# Patient Record
Sex: FEMALE | Race: OTHER | NOT HISPANIC OR LATINO | URBAN - METROPOLITAN AREA
[De-identification: names, ages, dates, MRNs, and addresses within clinical notes are randomized per-mention and may not be internally consistent; named-entity substitution may affect disease eponyms.]

---

## 2018-10-23 VITALS
TEMPERATURE: 97 F | RESPIRATION RATE: 14 BRPM | OXYGEN SATURATION: 97 % | DIASTOLIC BLOOD PRESSURE: 56 MMHG | HEART RATE: 62 BPM | SYSTOLIC BLOOD PRESSURE: 130 MMHG

## 2018-10-23 RX ORDER — CHLORHEXIDINE GLUCONATE 213 G/1000ML
1 SOLUTION TOPICAL ONCE
Qty: 0 | Refills: 0 | Status: DISCONTINUED | OUTPATIENT
Start: 2018-10-24 | End: 2018-10-24

## 2018-10-23 NOTE — H&P ADULT - HISTORY OF PRESENT ILLNESS
78 yo F with PMHx of CAD, HFpEF (EF 55-60% via ECHO 4/2018), DVT who presented to Cardiologist Dr. Horton with c/o SSCP radiating down right arm described as tight/pinching feeling like a hippo is sitting on her chest and of 5/10 intensity worsened with deep breath with associated dizziness occurring 4-5 times per day independent of exertion over past 2 weeks. Pt reports fatigue upon ambulation of 1-2 city blocks which is her same exercise tolerance as 1 year ago. Pt also endorses ___ pillow orthopnea and PND occurring 2-3 times per week over past ____________. Pt reports sleeping in hospital bed at which keeps her elevated.     ECHO 4/2018 revealed LVEF 55-60%  Holter monitor 11/2013 revealed NSR, no tachy-arrhythmias, no significant bradycardia, no pauses. **Obtained using [employer] [place]  # [id number]    78 yo F POOR HISTORIAN with PMHx of CAD, HFpEF (EF 55-60% via ECHO 4/2018), DVT who presented to Cardiologist Dr. Horton with c/o SSCP radiating down right arm and to back described as pressure/numbness and feeling like a hippo is sitting on her chest and of 5/10 intensity improved with SL NTG with associated dizziness occurring 2 times per week independent of exertion over past 1 month. Pt reports fatigue upon ambulation of 1-2 city blocks which is her same exercise tolerance as 1 year ago. Pt also endorses ___ pillow orthopnea and PND occurring 2-3 times per week over past ____________. Pt reports sleeping in hospital bed at which keeps her elevated.     Denies SOB, diaphoresis, palpitations, fatigue, LE edema, syncope, N/V, abdominal pain.   ECHO 4/2018 revealed LVEF 55-60%  Holter monitor 11/2013 revealed NSR, no tachy-arrhythmias, no significant bradycardia, no pauses. 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 Horton with c/o SSCP radiating down right arm and to back described as pressure/numbness and feeling like a hippo is sitting on her chest and of 5/10 intensity improved with SL NTG with associated dizziness occurring 2 times per week independent of exertion over past 1 month. Pt reports fatigue upon ambulation of 1-2 city blocks which is her same exercise tolerance as 1 year ago. Pt also endorses 90 degree orthopnea and PND occurring 2-3 times per week for years. Pt reports sleeping in hospital bed at which keeps her elevated. ECHO 4/12/2018 revealed LVEF 55-60%, mild-moderate mitral regurg, RVSP 45 mmHg.  NST 9/13/17 revealed normal perfusion per MD note.  Holter monitor 11/2013 revealed NSR, no tachy-arrhythmias, no significant bradycardia, no pauses.      In light of pt's risk factors, CCS class IV Anginal Sx, pt referred for cardiac cath with possible intervention to r/o underlying CAD. 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 Horton with c/o SSCP radiating down right arm and to back described as pressure/numbness and feeling like a hippo is sitting on her chest and of 5/10 intensity improved with SL NTG with associated dizziness occurring 2 times per week independent of exertion over past 1 month. Pt reports fatigue upon ambulation of 1-2 city blocks which is her same exercise tolerance as 1 year ago. Pt also endorses 90 degree orthopnea and PND occurring 2-3 times per week for years. Pt reports sleeping in hospital bed at which keeps her elevated. ECHO 4/12/2018 revealed LVEF 55-60%, mild-moderate mitral regurg, RVSP 45 mmHg.  NST 9/13/17 revealed normal perfusion per MD note.  Holter monitor 11/2013 revealed NSR, no tachy-arrhythmias, no significant bradycardia, no pauses.      In light of pt's risk factors, CCS class IV Anginal Sx, pt referred for cardiac cath with possible intervention to r/o underlying CAD.

## 2018-10-23 NOTE — H&P ADULT - PSH
Acquired cataract    H/O hand surgery    H/O hernia repair    H/O neck surgery  10/2014  H/O total hysterectomy    History of cholecystectomy    S/P appendectomy Acquired cataract  s/p B/L extraction 2006  H/O hand surgery    H/O hernia repair    H/O neck surgery  10/2014  H/O total hysterectomy    History of cholecystectomy    Nephrolithiasis  s/p kidney stone removal in 2006  S/P appendectomy

## 2018-10-23 NOTE — H&P ADULT - PMH
Asthma    CAD (coronary artery disease)    Cataract    Chronic back pain    CVA (cerebral vascular accident)    DM (diabetes mellitus)    Epilepsy    HTN (hypertension)    Hypercoagulable state    Hypothyroid    SLE (systemic lupus erythematosus)    Urinary incontinence Asthma    CAD (coronary artery disease)    Cataract    Chronic back pain    COPD (chronic obstructive pulmonary disease)    CVA (cerebral vascular accident)    DM (diabetes mellitus)    Epilepsy    GERD (gastroesophageal reflux disease)    HTN (hypertension)    Hypercoagulable state    Hypothyroid    Rectal hemorrhage    SLE (systemic lupus erythematosus)    Urinary incontinence

## 2018-10-23 NOTE — H&P ADULT - NSHPSOCIALHISTORY_GEN_ALL_CORE
Former smoker (quit 25-30 years ago, started at age 16, smoked 1 PPD)  Denies alcohol/elicit drug use

## 2018-10-23 NOTE — H&P ADULT - ASSESSMENT
79 y/o F VERY POOR HISTORIAN with PMHx of HTN, non-obstructive CAD (s/p diagnostic cath @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 Horton with c/o SSCP radiating down right arm and to back, improved with SL NTG, independent of exertion over past 1 month. In light of pt's risk factors and CCS class IV Anginal Sx, pt referred for cardiac cath with possible intervention to r/o underlying CAD. 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 Horton with c/o SSCP radiating down right arm and to back, improved with SL NTG, independent of exertion over past 1 month. In light of pt's risk factors and CCS class IV Anginal Sx, pt referred for cardiac cath with possible intervention to r/o underlying CAD.    Consented with use of  #[id number]  Risks & benefits of procedure and alternative therapy have been explained to the patient including but not limited to: allergic reaction, bleeding w/possible need for blood transfusion, infection, renal and vascular compromise, limb damage, arrhythmia, stroke, vessel dissection/perforation, Myocardial infarction, emergent CABG. Informed consent obtained and in chart. 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 Horton with c/o SSCP radiating down right arm and to back, improved with SL NTG, independent of exertion over past 1 month. In light of pt's risk factors and CCS class IV Anginal Sx, pt referred for cardiac cath with possible intervention to r/o underlying CAD.    Patient with Aspirin allergy, Dr. Clark aware. No pre-procedure loading as per Dr. Clark.     Consented with use of  #[id number]  Risks & benefits of procedure and alternative therapy have been explained to the patient including but not limited to: allergic reaction, bleeding w/possible need for blood transfusion, infection, renal and vascular compromise, limb damage, arrhythmia, stroke, vessel dissection/perforation, Myocardial infarction, emergent CABG. Informed consent obtained and in chart.

## 2018-10-24 ENCOUNTER — OUTPATIENT (OUTPATIENT)
Dept: OUTPATIENT SERVICES | Facility: HOSPITAL | Age: 78
LOS: 1 days | Discharge: NURSING, REHAB OR LONG-TERM CARE | End: 2018-10-24
Payer: MEDICARE

## 2018-10-24 DIAGNOSIS — Z90.49 ACQUIRED ABSENCE OF OTHER SPECIFIED PARTS OF DIGESTIVE TRACT: Chronic | ICD-10-CM

## 2018-10-24 DIAGNOSIS — Z98.890 OTHER SPECIFIED POSTPROCEDURAL STATES: Chronic | ICD-10-CM

## 2018-10-24 DIAGNOSIS — Z90.710 ACQUIRED ABSENCE OF BOTH CERVIX AND UTERUS: Chronic | ICD-10-CM

## 2018-10-24 DIAGNOSIS — N20.0 CALCULUS OF KIDNEY: Chronic | ICD-10-CM

## 2018-10-24 DIAGNOSIS — H26.9 UNSPECIFIED CATARACT: Chronic | ICD-10-CM

## 2018-10-24 LAB
ALBUMIN SERPL ELPH-MCNC: 4.3 G/DL — SIGNIFICANT CHANGE UP (ref 3.3–5)
ALP SERPL-CCNC: 85 U/L — SIGNIFICANT CHANGE UP (ref 40–120)
ALT FLD-CCNC: 18 U/L — SIGNIFICANT CHANGE UP (ref 10–45)
ANION GAP SERPL CALC-SCNC: 14 MMOL/L — SIGNIFICANT CHANGE UP (ref 5–17)
APTT BLD: 35.3 SEC — SIGNIFICANT CHANGE UP (ref 27.5–37.4)
AST SERPL-CCNC: 24 U/L — SIGNIFICANT CHANGE UP (ref 10–40)
BASOPHILS NFR BLD AUTO: 0.5 % — SIGNIFICANT CHANGE UP (ref 0–2)
BILIRUB SERPL-MCNC: 0.3 MG/DL — SIGNIFICANT CHANGE UP (ref 0.2–1.2)
BUN SERPL-MCNC: 12 MG/DL — SIGNIFICANT CHANGE UP (ref 7–23)
CALCIUM SERPL-MCNC: 9.9 MG/DL — SIGNIFICANT CHANGE UP (ref 8.4–10.5)
CHLORIDE SERPL-SCNC: 95 MMOL/L — LOW (ref 96–108)
CHOLEST SERPL-MCNC: 147 MG/DL — SIGNIFICANT CHANGE UP (ref 10–199)
CK MB CFR SERPL CALC: 1.9 NG/ML — SIGNIFICANT CHANGE UP (ref 0–6.7)
CK SERPL-CCNC: 78 U/L — SIGNIFICANT CHANGE UP (ref 25–170)
CO2 SERPL-SCNC: 27 MMOL/L — SIGNIFICANT CHANGE UP (ref 22–31)
CREAT SERPL-MCNC: 0.81 MG/DL — SIGNIFICANT CHANGE UP (ref 0.5–1.3)
CRP SERPL-MCNC: 0.22 MG/DL — SIGNIFICANT CHANGE UP (ref 0–0.4)
EOSINOPHIL NFR BLD AUTO: 2.6 % — SIGNIFICANT CHANGE UP (ref 0–6)
GLUCOSE BLDC GLUCOMTR-MCNC: 143 MG/DL — HIGH (ref 70–99)
GLUCOSE SERPL-MCNC: 149 MG/DL — HIGH (ref 70–99)
HBA1C BLD-MCNC: 6.3 % — HIGH (ref 4–5.6)
HCT VFR BLD CALC: 41 % — SIGNIFICANT CHANGE UP (ref 34.5–45)
HDLC SERPL-MCNC: 73 MG/DL — SIGNIFICANT CHANGE UP
HGB BLD-MCNC: 14 G/DL — SIGNIFICANT CHANGE UP (ref 11.5–15.5)
INR BLD: 1.03 — SIGNIFICANT CHANGE UP (ref 0.88–1.16)
LIPID PNL WITH DIRECT LDL SERPL: 47 MG/DL — SIGNIFICANT CHANGE UP
LYMPHOCYTES # BLD AUTO: 21.6 % — SIGNIFICANT CHANGE UP (ref 13–44)
MCHC RBC-ENTMCNC: 31.5 PG — SIGNIFICANT CHANGE UP (ref 27–34)
MCHC RBC-ENTMCNC: 34.1 G/DL — SIGNIFICANT CHANGE UP (ref 32–36)
MCV RBC AUTO: 92.1 FL — SIGNIFICANT CHANGE UP (ref 80–100)
MONOCYTES NFR BLD AUTO: 12.8 % — SIGNIFICANT CHANGE UP (ref 2–14)
NEUTROPHILS NFR BLD AUTO: 62.5 % — SIGNIFICANT CHANGE UP (ref 43–77)
PLATELET # BLD AUTO: 145 K/UL — LOW (ref 150–400)
POTASSIUM SERPL-MCNC: 4.4 MMOL/L — SIGNIFICANT CHANGE UP (ref 3.5–5.3)
POTASSIUM SERPL-SCNC: 4.4 MMOL/L — SIGNIFICANT CHANGE UP (ref 3.5–5.3)
PROT SERPL-MCNC: 7.1 G/DL — SIGNIFICANT CHANGE UP (ref 6–8.3)
PROTHROM AB SERPL-ACNC: 11.4 SEC — SIGNIFICANT CHANGE UP (ref 9.8–12.7)
RBC # BLD: 4.45 M/UL — SIGNIFICANT CHANGE UP (ref 3.8–5.2)
RBC # FLD: 12.2 % — SIGNIFICANT CHANGE UP (ref 10.3–16.9)
SODIUM SERPL-SCNC: 136 MMOL/L — SIGNIFICANT CHANGE UP (ref 135–145)
TOTAL CHOLESTEROL/HDL RATIO MEASUREMENT: 2 RATIO — LOW (ref 3.3–7.1)
TRIGL SERPL-MCNC: 137 MG/DL — SIGNIFICANT CHANGE UP (ref 10–149)
WBC # BLD: 8 K/UL — SIGNIFICANT CHANGE UP (ref 3.8–10.5)
WBC # FLD AUTO: 8 K/UL — SIGNIFICANT CHANGE UP (ref 3.8–10.5)

## 2018-10-24 PROCEDURE — 86140 C-REACTIVE PROTEIN: CPT

## 2018-10-24 PROCEDURE — 82550 ASSAY OF CK (CPK): CPT

## 2018-10-24 PROCEDURE — 85730 THROMBOPLASTIN TIME PARTIAL: CPT

## 2018-10-24 PROCEDURE — 93010 ELECTROCARDIOGRAM REPORT: CPT

## 2018-10-24 PROCEDURE — 93005 ELECTROCARDIOGRAM TRACING: CPT

## 2018-10-24 PROCEDURE — 80053 COMPREHEN METABOLIC PANEL: CPT

## 2018-10-24 PROCEDURE — 93454 CORONARY ARTERY ANGIO S&I: CPT | Mod: 26

## 2018-10-24 PROCEDURE — C1769: CPT

## 2018-10-24 PROCEDURE — 82962 GLUCOSE BLOOD TEST: CPT

## 2018-10-24 PROCEDURE — 85025 COMPLETE CBC W/AUTO DIFF WBC: CPT

## 2018-10-24 PROCEDURE — 80061 LIPID PANEL: CPT

## 2018-10-24 PROCEDURE — 93454 CORONARY ARTERY ANGIO S&I: CPT

## 2018-10-24 PROCEDURE — 82553 CREATINE MB FRACTION: CPT

## 2018-10-24 PROCEDURE — 83036 HEMOGLOBIN GLYCOSYLATED A1C: CPT

## 2018-10-24 PROCEDURE — C1894: CPT

## 2018-10-24 PROCEDURE — C1887: CPT

## 2018-10-24 PROCEDURE — 85610 PROTHROMBIN TIME: CPT

## 2018-10-24 RX ORDER — OXYCODONE HYDROCHLORIDE 5 MG/1
2 TABLET ORAL
Qty: 0 | Refills: 0 | COMMUNITY

## 2018-10-24 RX ORDER — MIRABEGRON 50 MG/1
1 TABLET, EXTENDED RELEASE ORAL
Qty: 0 | Refills: 0 | COMMUNITY

## 2018-10-24 RX ORDER — CHOLECALCIFEROL (VITAMIN D3) 125 MCG
1 CAPSULE ORAL
Qty: 0 | Refills: 0 | COMMUNITY

## 2018-10-24 RX ORDER — HYDROXYCHLOROQUINE SULFATE 200 MG
1 TABLET ORAL
Qty: 0 | Refills: 0 | COMMUNITY

## 2018-10-24 RX ORDER — SOTALOL HCL 120 MG
0.5 TABLET ORAL
Qty: 0 | Refills: 0 | COMMUNITY

## 2018-10-24 RX ORDER — ISOSORBIDE MONONITRATE 60 MG/1
1 TABLET, EXTENDED RELEASE ORAL
Qty: 0 | Refills: 0 | COMMUNITY

## 2018-10-24 RX ORDER — MONTELUKAST 4 MG/1
1 TABLET, CHEWABLE ORAL
Qty: 0 | Refills: 0 | COMMUNITY

## 2018-10-24 RX ORDER — RIVAROXABAN 15 MG-20MG
1 KIT ORAL
Qty: 0 | Refills: 0 | COMMUNITY

## 2018-10-24 RX ORDER — RANOLAZINE 500 MG/1
1 TABLET, FILM COATED, EXTENDED RELEASE ORAL
Qty: 0 | Refills: 0 | COMMUNITY

## 2018-10-24 RX ORDER — DONEPEZIL HYDROCHLORIDE 10 MG/1
1 TABLET, FILM COATED ORAL
Qty: 0 | Refills: 0 | COMMUNITY

## 2018-10-24 RX ORDER — PANTOPRAZOLE SODIUM 20 MG/1
1 TABLET, DELAYED RELEASE ORAL
Qty: 0 | Refills: 0 | COMMUNITY

## 2018-10-24 RX ORDER — GABAPENTIN 400 MG/1
1 CAPSULE ORAL
Qty: 0 | Refills: 0 | COMMUNITY

## 2018-10-24 RX ORDER — LEVOTHYROXINE SODIUM 125 MCG
1 TABLET ORAL
Qty: 0 | Refills: 0 | COMMUNITY

## 2018-10-24 RX ORDER — HYDROXYZINE HCL 10 MG
1 TABLET ORAL
Qty: 0 | Refills: 0 | COMMUNITY

## 2018-10-24 RX ORDER — RISEDRONATE SODIUM 25.8; 4.2 MG/1; MG/1
1 TABLET, FILM COATED ORAL
Qty: 0 | Refills: 0 | COMMUNITY

## 2018-10-24 RX ORDER — TIZANIDINE 4 MG/1
1 TABLET ORAL
Qty: 0 | Refills: 0 | COMMUNITY

## 2018-10-24 RX ORDER — SODIUM CHLORIDE 9 MG/ML
500 INJECTION INTRAMUSCULAR; INTRAVENOUS; SUBCUTANEOUS
Qty: 0 | Refills: 0 | Status: DISCONTINUED | OUTPATIENT
Start: 2018-10-24 | End: 2018-10-24

## 2018-10-24 RX ORDER — LEVETIRACETAM 250 MG/1
1 TABLET, FILM COATED ORAL
Qty: 0 | Refills: 0 | COMMUNITY

## 2018-10-24 RX ORDER — LEVALBUTEROL 1.25 MG/.5ML
2 SOLUTION, CONCENTRATE RESPIRATORY (INHALATION)
Qty: 0 | Refills: 0 | COMMUNITY

## 2018-10-24 RX ORDER — FLUTICASONE PROPIONATE AND SALMETEROL 50; 250 UG/1; UG/1
1 POWDER ORAL; RESPIRATORY (INHALATION)
Qty: 0 | Refills: 0 | COMMUNITY

## 2018-10-24 RX ORDER — NITROGLYCERIN 6.5 MG
1 CAPSULE, EXTENDED RELEASE ORAL
Qty: 0 | Refills: 0 | COMMUNITY

## 2018-10-24 RX ORDER — ATORVASTATIN CALCIUM 80 MG/1
1 TABLET, FILM COATED ORAL
Qty: 0 | Refills: 0 | COMMUNITY

## 2018-10-24 RX ADMIN — SODIUM CHLORIDE 75 MILLILITER(S): 9 INJECTION INTRAMUSCULAR; INTRAVENOUS; SUBCUTANEOUS at 11:36

## 2018-10-24 NOTE — PROGRESS NOTE ADULT - SUBJECTIVE AND OBJECTIVE BOX
Interventional Cardiology PA SDA Discharge Note    Patient without complaints.     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 Horton with c/o SSCP radiating down right arm and to back described as pressure/numbness and feeling like a hippo is sitting on her chest and of 5/10 intensity improved with SL NTG with associated dizziness occurring 2 times per week independent of exertion over past 1 month. Pt reports fatigue upon ambulation of 1-2 city blocks which is her same exercise tolerance as 1 year ago. Pt also endorses 90 degree orthopnea and PND occurring 2-3 times per week for years. Pt reports sleeping in hospital bed at which keeps her elevated. ECHO 4/12/2018 revealed LVEF 55-60%, mild-moderate mitral regurg, RVSP 45 mmHg.  NST 9/13/17 revealed normal perfusion per MD note.  Holter monitor 11/2013 revealed NSR, no tachy-arrhythmias, no significant bradycardia, no pauses.      In light of pt's risk factors, CCS class IV Anginal Sx, pt referred for cardiac cath with possible intervention to r/o underlying CAD.    Dx Cath: LM normal/LCx/RCA normal; distal LAD 50%.  Recommended to continue medical management.       1.	Stable for discharge as per attending Dr. Arteaga________ after bed rest, pt voids, wrist stable and 30 minutes of ambulation.  2.	Follow-up with PMD/Cardiologist __Dr Horton_________ in 1-2 weeks  3.	Discharged forms signed and copies in chart